# Patient Record
Sex: FEMALE | Race: WHITE | NOT HISPANIC OR LATINO | URBAN - METROPOLITAN AREA
[De-identification: names, ages, dates, MRNs, and addresses within clinical notes are randomized per-mention and may not be internally consistent; named-entity substitution may affect disease eponyms.]

---

## 2024-07-12 ENCOUNTER — CLINICAL SUPPORT (OUTPATIENT)
Dept: AUDIOLOGY | Facility: CLINIC | Age: 59
End: 2024-07-12
Payer: COMMERCIAL

## 2024-07-12 DIAGNOSIS — H90.3 SENSORINEURAL HEARING LOSS (SNHL) OF BOTH EARS: Primary | ICD-10-CM

## 2024-07-12 PROCEDURE — 92557 COMPREHENSIVE HEARING TEST: CPT | Performed by: AUDIOLOGIST

## 2024-07-12 PROCEDURE — V5011 HEARING AID FITTING/CHECKING: HCPCS | Mod: AUDSP | Performed by: AUDIOLOGIST

## 2024-07-12 PROCEDURE — 92550 TYMPANOMETRY & REFLEX THRESH: CPT | Performed by: AUDIOLOGIST

## 2024-07-12 ASSESSMENT — PAIN SCALES - GENERAL: PAINLEVEL_OUTOF10: 0 - NO PAIN

## 2024-07-12 ASSESSMENT — PAIN - FUNCTIONAL ASSESSMENT: PAIN_FUNCTIONAL_ASSESSMENT: 0-10

## 2024-07-12 NOTE — PROGRESS NOTES
"ADULT AUDIOLOGY EVALUATION    Name:  Deb Negrete \"Ynes\"  :  1965  Age:  58 y.o.  Date of Evaluation:  2024    Time: 10:00-11:00    HISTORY     Deb Negrete, 58 y.o. is seen today for an audiologic evaluation. Her previous audiogram is from 2019 and she has not been seen for an updated hearing test since then. She stated that she feels that her hearing is okay, but that she is using the BAHA less due to it amplifying to much noise at work. She reported that when she wears a surgical cap at work, it creates a distracting sound that is \"more of a nuisance than help.\" She also stated that when she is in noise, she can only hear what people next to her are saying if she blocks her right (better hearing) ear. She is due for an upgrade at this time since she is currently using her BAHA 5.     BAHA 5 fit on 18  Left ear: Baha 5 Sound Processor  SN: [id number]    Warranty expires 20  [employer] #[id number]   Warranty expires 19      EVALUATION     See audiogram for testing results.     TEST RESULTS     Otoscopic Evaluation:  Right Ear: Clear ear canal with unremarkable tympanic membrane  Left Ear: Clear ear canal with unremarkable tympanic membrane    Tympanometry:   Right Ear: Normal, type A tympanogram with normal ear canal volume, peak pressure and compliance.   Left Ear: Normal, type A tympanogram with normal ear canal volume, peak pressure and compliance.     Ipsilateral Acoustic Reflexes:   Right Ear: Present 500-4000 Hz.   Left Ear: Did not test due to dead ear status.    Pure Tone Audiometry (125-8000 Hz):   Right Ear: Mild hearing loss at 125-500 Hz rising to normal hearing sensitivity   Left Ear: did not test due to history of no-response profound hearing loss since     Speech Audiometry:   Right Ear:    Speech Reception Threshold (SRT) was obtained at 10 dBHL.   Word Recognition scores were good in quiet when words were presented at 50 dBHL using NU-6 word ordered by " difficulty.  Left Ear:  Did not test due to dead ear status      IMPRESSIONS     Today's test results indicate normal middle ear functioning with mild hearing loss rising to normal hearing sensitivity in the right ear with profound hearing loss in the left ear. Connected her BAHA to [employer] software to reflect subjective changes in hearing in the audiogram programming. Feedback analyzer and [employer] Direct were completed.  She was satisfied with these changes at this time. Discussed that she is due for an upgrade if she feels she is no longer receiving as much benefit from the device. She recently purchased a new iPhone 15 Pro that we found through calling [employer] it is not compatible to be paired to her Iphone or the BAHA Smart estella at this time. Recommended that if she is interested in upgrading her device to call [employer] to begin upgrade process. She will also need to follow up with ENT for medical clearance.      RECOMMENDATIONS     Contact [employer] for device upgrade to BAHA 6 processor if she is interested.    Return for annual hearing evaluation or sooner should concerns arise.    SINDHU Mccartney.  Audiology Student Extern    Degree of Hearing Sensitivity Decibel Range   Within Normal Limits (WNL) 0-25   Mild 26-40   Moderate 41-55   Moderately-Severe 56-70   Severe 71-90   Profound 91+      Key   CNT/DNT Could Not Test/Did Not Test   TM Tympanic Membrane   WNL Within Normal Limits   HA Hearing Aid   SNHL Sensorineural Hearing Loss   CHL Conductive Hearing Loss   NIHL Noise-Induced Hearing Loss   ECV Ear Canal Volume   MLV Monitored Live Voice

## 2025-07-18 ENCOUNTER — APPOINTMENT (OUTPATIENT)
Dept: AUDIOLOGY | Facility: CLINIC | Age: 60
End: 2025-07-18
Payer: COMMERCIAL